# Patient Record
Sex: FEMALE | Race: WHITE
[De-identification: names, ages, dates, MRNs, and addresses within clinical notes are randomized per-mention and may not be internally consistent; named-entity substitution may affect disease eponyms.]

---

## 2018-03-27 ENCOUNTER — HOSPITAL ENCOUNTER (OUTPATIENT)
Dept: HOSPITAL 92 - MAMMO | Age: 73
End: 2018-03-27
Payer: MEDICARE

## 2018-03-27 DIAGNOSIS — D05.12: Primary | ICD-10-CM

## 2018-03-27 PROCEDURE — 0HBU3ZX EXCISION OF LEFT BREAST, PERCUTANEOUS APPROACH, DIAGNOSTIC: ICD-10-PCS

## 2018-03-27 PROCEDURE — 19081 BX BREAST 1ST LESION STRTCTC: CPT

## 2018-03-27 PROCEDURE — 76098 X-RAY EXAM SURGICAL SPECIMEN: CPT

## 2018-03-27 PROCEDURE — 88305 TISSUE EXAM BY PATHOLOGIST: CPT

## 2018-03-27 NOTE — MMO
LEFT BREAST STEREOTACTIC BIOPSY:

 

History: Left breast calcifications. 

 

Comparison: 3-20-18

 

FINDINGS: 

Successful left breast stereotactic biopsy. Calcifications are present in the biopsy sample. Biopsy c
lips was placed which is adjacent to the remaining calcifications. 

 

Technique: 

Consent was obtained to perform a left breast stereotactic biopsy. Patient was placed in a prone posi
tion on the stereotactic table. Under CC compression, the calcifications were identified. Skin was pr
epped and draped in sterile fashion. 1% Lidocaine, buffered with sodium bicarbonate used for local an
esthesia. Needle position with respect to the calcification was determine pre and post firing. Two se
ts of samples were performed. First sample set did not demonstrate calcification. The second sample s
et did have calcifications. The biopsy clips was placed. Patient tolerated the procedure well. No imm
ediate or post procedure complication. 

 

POST PROCEDURE MAMMOGRAPHY:

Post procedure mammogram demonstrates a surgical site with biopsy clip adjacent to the remaining calc
ifications. 

 

IMPRESSION: 

Successful left breast stereotactic biopsy. Biopsy clips was placed. 

 

POS: [facility]